# Patient Record
Sex: FEMALE | Race: BLACK OR AFRICAN AMERICAN
[De-identification: names, ages, dates, MRNs, and addresses within clinical notes are randomized per-mention and may not be internally consistent; named-entity substitution may affect disease eponyms.]

---

## 2019-06-17 ENCOUNTER — HOSPITAL ENCOUNTER (OUTPATIENT)
Dept: HOSPITAL 61 - KCIC | Age: 44
Discharge: HOME | End: 2019-06-17
Attending: NURSE PRACTITIONER
Payer: COMMERCIAL

## 2019-06-17 DIAGNOSIS — R07.89: Primary | ICD-10-CM

## 2019-06-17 DIAGNOSIS — F17.200: ICD-10-CM

## 2019-06-17 DIAGNOSIS — R05: ICD-10-CM

## 2019-06-17 PROCEDURE — 71046 X-RAY EXAM CHEST 2 VIEWS: CPT

## 2019-06-17 NOTE — [facility]
CHEST PA   LATERAL

 

History: Chronic cough, smoker of 23 years, left posterior chest pain

 

Comparison: None.

 

Findings:

2 views of the chest are submitted. 

There is no lobar consolidation, pleural fluid, pneumothorax. Cardiac 

silhouette is upper limits of normal. There are some small foci of 

relative nodularity of the perihilar regions bilaterally although fairly 

opaque.

 

Impression: 

 

1.  No acute radiographic abnormality is identified. CT would more 

accurately assess nodules.

 

Electronically signed by: Satya Mathews MD (6/17/2019 9:18 AM) 

[id number]